# Patient Record
Sex: FEMALE
[De-identification: names, ages, dates, MRNs, and addresses within clinical notes are randomized per-mention and may not be internally consistent; named-entity substitution may affect disease eponyms.]

---

## 2017-05-09 ENCOUNTER — HOSPITAL ENCOUNTER (OUTPATIENT)
Dept: HOSPITAL 45 - C.PAPS | Age: 47
Discharge: HOME | End: 2017-05-09
Attending: OBSTETRICS & GYNECOLOGY
Payer: COMMERCIAL

## 2017-05-09 DIAGNOSIS — C55: Primary | ICD-10-CM

## 2017-10-27 ENCOUNTER — HOSPITAL ENCOUNTER (OUTPATIENT)
Dept: HOSPITAL 45 - C.MAMM | Age: 47
Discharge: HOME | End: 2017-10-27
Attending: FAMILY MEDICINE
Payer: COMMERCIAL

## 2017-10-27 DIAGNOSIS — R92.8: ICD-10-CM

## 2017-10-27 DIAGNOSIS — Z12.31: Primary | ICD-10-CM

## 2017-10-30 NOTE — MAMMOGRAPHY REPORT
BILATERAL DIGITAL SCREENING MAMMOGRAM TOMOSYNTHESIS WITH CAD: 10/27/2017

CLINICAL HISTORY: Routine screening.  Patient has no complaints.  





TECHNIQUE:  Breast tomosynthesis in addition to standard 2D mammography was performed. Current study 
was also evaluated with a Computer Aided Detection (CAD) system.  



COMPARISON: Comparison is made to exams dated:  10/12/2016 mammogram, 10/8/2015 mammogram, and 9/20/2
013 mammogram - [facility].   



BREAST COMPOSITION:  There are scattered areas of fibroglandular density in both breasts.  



FINDINGS: There is a nodular 9 mm asymmetry seen within the right breast along the posterior nipple l
ine on the cc view middle depth, possibly projecting superiorly on the MLO view, which may represent 
normal overlapping fibroglandular tissue although spot compression tomosynthesis views and possible b
reast ultrasound are recommended for further evaluation.



The remainder of both breasts are stable compared to prior exams, without suspicious masses, calcific
ations, or areas of architectural distortion noted.



IMPRESSION:  ACR BI-RADS CATEGORY 0: INCOMPLETE EVALUATION:  NEED ADDITIONAL IMAGING EVALUATION

Right breast asymmetry, for which additional imaging evaluation is recommended.  The patient will be 
called to schedule an appointment.  





Approximately 10% of breast cancers are not detected with mammography. A negative mammographic report
 should not delay biopsy if a clinically suggestive mass is present.



Jocelyne Washburn M.D.          

ah/:10/27/2017 17:05:31  



Imaging Technologist: Cori SAGASTUME)(M), [facility]

letter sent: Addl Imaging 0  

BI-RADS Code: ACR BI-RADS Category 0: Incomplete Evaluation:  Need Additional Imaging Evaluation

## 2017-11-22 ENCOUNTER — HOSPITAL ENCOUNTER (OUTPATIENT)
Dept: HOSPITAL 45 - C.MAMM | Age: 47
End: 2017-11-22
Attending: FAMILY MEDICINE
Payer: COMMERCIAL

## 2017-11-22 DIAGNOSIS — N64.59: Primary | ICD-10-CM

## 2017-11-24 NOTE — MAMMOGRAPHY REPORT
UNILATERAL RIGHT DIGITAL DIAGNOSTIC MAMMOGRAM TOMOSYNTHESIS AND TARGETED RIGHT ULTRASOUND: 11/22/2017


CLINICAL HISTORY: 47-year-old woman called back from screening mammography for an nodular asymmetry a
long the posterior nipple line on the right CC view.  





TECHNIQUE: Spot compression right CC and MLO tomosynthesis images with reconstructed C-view were obta
ined.



COMPARISON: Comparison is made to exams dated:  10/27/2017 mammogram, 10/12/2016 mammogram, 10/8/2015
 mammogram, and 9/20/2013 mammogram - [facility].   



BREAST COMPOSITION:  There are scattered areas of fibroglandular density in the right breast.  



FINDINGS: The supplemental spot compression tomosynthesis CC view of the right breast demonstrates pa
rtial effacement of the 9 mm nodular asymmetry in the middle one third of the breast along the poster
ior nipple line.  It is still thought to be identified on the CC spot compression tomosynthesis slice
 35/78.  When comparing the CC spot compression view back to all available prior mammograms, the appe
arance is somewhat similar to the 2015, and 2013 mammograms, suggesting benignity.  Further evaluatio
n with ultrasound was performed.  No other obvious mass, focal area of distortion or suspicious calci
fications are seen in the visualized right breast.  No corresponding abnormality is identified on the
 spot compression MLO tomosynthesis images.  



Targeted ultrasound was performed throughout the right breast 11:00, 12:00, 1:00, retroareolar and 5:
00 through 7:00 axes.  Sonographically normal tissue is seen without a suspicious solid or cystic mas
s.



IMPRESSION:  ACR-BI-RADS CATEGORY 3: PROBABLY BENIGN, TARGETED ULTRASOUND ACR-BI-RADS CATEGORY 3: PRO
BABLY BENIGN 

1.  The supplemental spot compression tomosynthesis views of the right breast demonstrate a 9 mm nodu
lar asymmetry located along the posterior nipple line is less conspicuous and could represent normal 
fibroglandular tissue, as the appearance is also somewhat similar to previous mammograms.  No suspici
ous sonographic correlate was identified.  However, a short interval follow-up right diagnostic tomos
ynthesis mammogram and possible ultrasound is recommended to ensure stability in 6 months, given the 
slight increased conspicuity on the 10/27/2017 screening exam.



These results and recommendations were discussed with the patient at the time of the exam.  She tenta
tively scheduled a follow-up appointment prior to leaving our department.



Approximately 10% of breast cancers are not detected with mammography. A negative mammographic report
 should not delay biopsy if a clinically suggestive mass is present.



Annette Haywood M.D.          

ay/:11/22/2017 12:38:51  



Imaging Technologist: Jessie Bailey RT(R)(M), [facility]

letter sent: Follow Up Recommended 3  

BI-RADS Code: ACR-BI-RADS Category 3: Probably Benign  Ultrasound BI-RADS: ACR-BI-RADS Category 3: Pr
obably Benign

## 2018-05-22 ENCOUNTER — HOSPITAL ENCOUNTER (OUTPATIENT)
Dept: HOSPITAL 45 - C.MAMM | Age: 48
Discharge: HOME | End: 2018-05-22
Attending: FAMILY MEDICINE
Payer: COMMERCIAL

## 2018-05-22 DIAGNOSIS — N64.89: ICD-10-CM

## 2018-05-22 DIAGNOSIS — R92.8: Primary | ICD-10-CM

## 2018-05-22 NOTE — MAMMOGRAPHY REPORT
UNILATERAL RIGHT DIGITAL DIAGNOSTIC MAMMOGRAM TOMOSYNTHESIS WITH CAD: 5/22/2018

CLINICAL HISTORY: 48-year-old woman presents for follow-up in the right breast for a nodular asymmetr
y along the posterior nipple line on the CC view that effaced with additional supplemental tomosynthe
sis images and no suspicious sonographic correlate was identified.  





TECHNIQUE: Right breast tomosynthesis in addition to standard 2D mammography was performed. Current irina aguirre was also evaluated with a Computer Aided Detection (CAD) system.  



COMPARISON: Comparison is made to exams dated:  9/20/2013 mammogram, 10/8/2015 mammogram, 10/12/2016 
mammogram, 10/27/2017 mammogram, 11/22/2017 ultrasound, and 11/22/2017 mammogram - [facility].   



BREAST COMPOSITION:  There are scattered areas of fibroglandular density in the right breast.  



FINDINGS: There are 3 stable circumscribed lobulated subcentimeter masses in the upper outer middle o
ne third of the right breast, which are unchanged dating back to at least 2013 and most compatible wi
th benign intramammary lymph nodes.  There is a 5 mm nodular asymmetry in the middle one third of the
 right breast slightly lateral to the posterior nipple line on the CC view identified in the area of 
previously observed 9 mm nodular asymmetry.  This asymmetry is decreased in size and prominence kiera
ring to the prior mammogram although this could be in part due to positioning.  There is no associate
d calcification or spiculation.  No other new suspicious masses, asymmetries, areas of architectural 
distortion or suspicious microcalcifications identified.  There are minimal vascular calcifications i
n the right breast.



IMPRESSION:  ACR-BI-RADS CATEGORY 3: PROBABLY BENIGN

A subcentimeter asymmetry along the posterior nipple line on the right CC view is less prominent comp
aring to the prior full-field right CC view dated 10/27/2017, suggesting benignity.  Given that this 
could be in part due to positioning, another short interval follow-up right diagnostic tomosynthesis 
mammogram and possible ultrasound is recommended to ensure longer stability.  Annual left mammography
 will also be due at that time.



These results and recommendations were discussed with the patient at the time of the exam.



Approximately 10% of breast cancers are not detected with mammography. A negative mammographic report
 should not delay biopsy if a clinically suggestive mass is present.



Annette Haywood M.D.          

ay/:5/22/2018 08:55:19  



Imaging Technologist: Jessie NAGY(R)(M), [facility]

letter sent: Follow Up Recommended 3  

BI-RADS Code: ACR-BI-RADS Category 3: Probably Benign